# Patient Record
Sex: FEMALE | Race: WHITE | NOT HISPANIC OR LATINO | Employment: FULL TIME | URBAN - METROPOLITAN AREA
[De-identification: names, ages, dates, MRNs, and addresses within clinical notes are randomized per-mention and may not be internally consistent; named-entity substitution may affect disease eponyms.]

---

## 2017-06-14 ENCOUNTER — OFFICE VISIT (OUTPATIENT)
Dept: MEDICAL GROUP | Age: 62
End: 2017-06-14
Payer: COMMERCIAL

## 2017-06-14 VITALS
BODY MASS INDEX: 26.31 KG/M2 | HEIGHT: 62 IN | TEMPERATURE: 98.2 F | HEART RATE: 99 BPM | DIASTOLIC BLOOD PRESSURE: 70 MMHG | OXYGEN SATURATION: 96 % | WEIGHT: 143 LBS | SYSTOLIC BLOOD PRESSURE: 120 MMHG

## 2017-06-14 DIAGNOSIS — R73.01 IMPAIRED FASTING GLUCOSE: ICD-10-CM

## 2017-06-14 DIAGNOSIS — F43.21 ADJUSTMENT DISORDER WITH DEPRESSED MOOD: ICD-10-CM

## 2017-06-14 DIAGNOSIS — Z12.31 VISIT FOR SCREENING MAMMOGRAM: ICD-10-CM

## 2017-06-14 DIAGNOSIS — E55.9 VITAMIN D INSUFFICIENCY: ICD-10-CM

## 2017-06-14 DIAGNOSIS — E89.0 S/P PARTIAL THYROIDECTOMY: ICD-10-CM

## 2017-06-14 DIAGNOSIS — E78.00 HYPERCHOLESTEROLEMIA: ICD-10-CM

## 2017-06-14 DIAGNOSIS — Z86.39 HISTORY OF THYROID NODULE: ICD-10-CM

## 2017-06-14 DIAGNOSIS — Z12.11 SCREENING FOR COLON CANCER: ICD-10-CM

## 2017-06-14 PROCEDURE — 99204 OFFICE O/P NEW MOD 45 MIN: CPT | Performed by: INTERNAL MEDICINE

## 2017-06-14 ASSESSMENT — PATIENT HEALTH QUESTIONNAIRE - PHQ9
SUM OF ALL RESPONSES TO PHQ QUESTIONS 1-9: 15
CLINICAL INTERPRETATION OF PHQ2 SCORE: 6
5. POOR APPETITE OR OVEREATING: 0 - NOT AT ALL

## 2017-06-14 ASSESSMENT — PAIN SCALES - GENERAL: PAINLEVEL: NO PAIN

## 2017-06-15 NOTE — ASSESSMENT & PLAN NOTE
Patient has fasting blood sugar in the past. She is not very compliance with diet and exercise. She has family history of diabetes on her brother.

## 2017-06-15 NOTE — ASSESSMENT & PLAN NOTE
Patient reports that she has multiple thyroid nodules, but she does not have thyrotoxicosis or hyperthyroidism. She underwent partial thyroidectomy with removal of left thyroid and isthmus in 2009. She wants to check her thyroid hormone and thyroid ultrasound. She denies signs and symptoms of hypo-or hyperthyroidism currently. She has never taken thyroid hormone supplement. She reported that her mom has hypothyroid and needed to take thyroid hormone supplement.

## 2017-06-15 NOTE — ASSESSMENT & PLAN NOTE
Patient stated that she has low vitamin D and was told to take vitamin D 2000 units daily. She has not recheck her vitamin D for a few years. She wants to repeat vitamin D level.

## 2017-06-15 NOTE — ASSESSMENT & PLAN NOTE
She reported that she has multiple thyroid nodule that never has hyperthyroid state. She had a partial thyroidectomy in 2009. She stated that biopsy was negative for cancer.

## 2017-06-15 NOTE — ASSESSMENT & PLAN NOTE
Patient was told that her cholesterol level was high. She has never been treated with medication. She wants to check her cholesterol level.
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
## 2017-06-21 ENCOUNTER — HOSPITAL ENCOUNTER (OUTPATIENT)
Dept: RADIOLOGY | Facility: MEDICAL CENTER | Age: 62
End: 2017-06-21
Attending: INTERNAL MEDICINE
Payer: COMMERCIAL

## 2017-06-21 ENCOUNTER — TELEPHONE (OUTPATIENT)
Dept: MEDICAL GROUP | Age: 62
End: 2017-06-21

## 2017-06-21 DIAGNOSIS — E89.0 S/P PARTIAL THYROIDECTOMY: ICD-10-CM

## 2017-06-21 DIAGNOSIS — Z86.39 HISTORY OF THYROID NODULE: ICD-10-CM

## 2017-06-21 PROCEDURE — 76536 US EXAM OF HEAD AND NECK: CPT

## 2017-06-21 NOTE — TELEPHONE ENCOUNTER
Phone Number Called: [phone] (home)     Message: Patient informed of Dr. Garcia's message below and verbalized understanding.    Left Message for patient to call back: no

## 2017-06-21 NOTE — TELEPHONE ENCOUNTER
----- Message from Aminata Garcia M.D. sent at 6/21/2017  3:13 PM PDT -----  Please inform patient that her recent neck ultrasound showed that she has enlarged right thyroid gland, but no tumor found on thyroid gland. Left thyroid gland is surgically absent.    I will discuss the result again with patient on her upcoming follow-up appointment on 6/28/17.    Aminata Garcia M.D.

## 2017-06-24 LAB — HEMOCCULT STL QL IA: NEGATIVE

## 2017-06-27 ENCOUNTER — TELEPHONE (OUTPATIENT)
Dept: MEDICAL GROUP | Age: 62
End: 2017-06-27

## 2017-06-27 ENCOUNTER — HOSPITAL ENCOUNTER (OUTPATIENT)
Dept: RADIOLOGY | Facility: MEDICAL CENTER | Age: 62
End: 2017-06-27
Attending: INTERNAL MEDICINE
Payer: COMMERCIAL

## 2017-06-27 DIAGNOSIS — Z12.31 VISIT FOR SCREENING MAMMOGRAM: ICD-10-CM

## 2017-06-27 LAB
25(OH)D3+25(OH)D2 SERPL-MCNC: 42.8 NG/ML (ref 30–100)
ALBUMIN SERPL-MCNC: 4.8 G/DL (ref 3.6–4.8)
ALBUMIN/GLOB SERPL: 1.8 {RATIO} (ref 1.2–2.2)
ALP SERPL-CCNC: 64 IU/L (ref 39–117)
ALT SERPL-CCNC: 27 IU/L (ref 0–32)
AST SERPL-CCNC: 22 IU/L (ref 0–40)
BASOPHILS # BLD AUTO: 0.1 X10E3/UL (ref 0–0.2)
BASOPHILS NFR BLD AUTO: 1 %
BILIRUB SERPL-MCNC: 0.5 MG/DL (ref 0–1.2)
BUN SERPL-MCNC: 11 MG/DL (ref 8–27)
BUN/CREAT SERPL: 14 (ref 12–28)
CALCIUM SERPL-MCNC: 10.2 MG/DL (ref 8.7–10.3)
CHLORIDE SERPL-SCNC: 101 MMOL/L (ref 96–106)
CHOLEST SERPL-MCNC: 237 MG/DL (ref 100–199)
CO2 SERPL-SCNC: 22 MMOL/L (ref 18–29)
COMMENT 011824: ABNORMAL
CREAT SERPL-MCNC: 0.76 MG/DL (ref 0.57–1)
EOSINOPHIL # BLD AUTO: 0.1 X10E3/UL (ref 0–0.4)
EOSINOPHIL NFR BLD AUTO: 2 %
ERYTHROCYTE [DISTWIDTH] IN BLOOD BY AUTOMATED COUNT: 14.6 % (ref 12.3–15.4)
GLOBULIN SER CALC-MCNC: 2.6 G/DL (ref 1.5–4.5)
GLUCOSE SERPL-MCNC: 95 MG/DL (ref 65–99)
HBA1C MFR BLD: 5.7 % (ref 4.8–5.6)
HCT VFR BLD AUTO: 46.3 % (ref 34–46.6)
HDLC SERPL-MCNC: 70 MG/DL
HGB BLD-MCNC: 15.5 G/DL (ref 11.1–15.9)
IMM GRANULOCYTES # BLD: 0 X10E3/UL (ref 0–0.1)
IMM GRANULOCYTES NFR BLD: 0 %
IMMATURE CELLS  115398: NORMAL
LDLC SERPL CALC-MCNC: 131 MG/DL (ref 0–99)
LYMPHOCYTES # BLD AUTO: 2 X10E3/UL (ref 0.7–3.1)
LYMPHOCYTES NFR BLD AUTO: 31 %
MCH RBC QN AUTO: 30.4 PG (ref 26.6–33)
MCHC RBC AUTO-ENTMCNC: 33.5 G/DL (ref 31.5–35.7)
MCV RBC AUTO: 91 FL (ref 79–97)
MONOCYTES # BLD AUTO: 0.4 X10E3/UL (ref 0.1–0.9)
MONOCYTES NFR BLD AUTO: 6 %
MORPHOLOGY BLD-IMP: NORMAL
NEUTROPHILS # BLD AUTO: 4 X10E3/UL (ref 1.4–7)
NEUTROPHILS NFR BLD AUTO: 60 %
NRBC BLD AUTO-RTO: NORMAL %
PLATELET # BLD AUTO: 251 X10E3/UL (ref 150–379)
POTASSIUM SERPL-SCNC: 4 MMOL/L (ref 3.5–5.2)
PROT SERPL-MCNC: 7.4 G/DL (ref 6–8.5)
RBC # BLD AUTO: 5.1 X10E6/UL (ref 3.77–5.28)
SODIUM SERPL-SCNC: 143 MMOL/L (ref 134–144)
T4 FREE SERPL-MCNC: 1.64 NG/DL (ref 0.82–1.77)
TRIGL SERPL-MCNC: 182 MG/DL (ref 0–149)
TSH SERPL DL<=0.005 MIU/L-ACNC: 0.7 UIU/ML (ref 0.45–4.5)
VLDLC SERPL CALC-MCNC: 36 MG/DL (ref 5–40)
WBC # BLD AUTO: 6.6 X10E3/UL (ref 3.4–10.8)

## 2017-06-27 PROCEDURE — 77063 BREAST TOMOSYNTHESIS BI: CPT

## 2017-06-28 ENCOUNTER — OFFICE VISIT (OUTPATIENT)
Dept: MEDICAL GROUP | Age: 62
End: 2017-06-28
Payer: COMMERCIAL

## 2017-06-28 VITALS
WEIGHT: 141.6 LBS | HEART RATE: 81 BPM | BODY MASS INDEX: 26.06 KG/M2 | OXYGEN SATURATION: 97 % | DIASTOLIC BLOOD PRESSURE: 68 MMHG | TEMPERATURE: 97.4 F | SYSTOLIC BLOOD PRESSURE: 132 MMHG | HEIGHT: 62 IN

## 2017-06-28 DIAGNOSIS — Z23 NEED FOR VACCINATION: ICD-10-CM

## 2017-06-28 DIAGNOSIS — E78.00 HYPERCHOLESTEROLEMIA: ICD-10-CM

## 2017-06-28 DIAGNOSIS — R73.01 IMPAIRED FASTING GLUCOSE: ICD-10-CM

## 2017-06-28 DIAGNOSIS — E55.9 VITAMIN D INSUFFICIENCY: ICD-10-CM

## 2017-06-28 DIAGNOSIS — E89.0 S/P PARTIAL THYROIDECTOMY: ICD-10-CM

## 2017-06-28 PROCEDURE — 90736 HZV VACCINE LIVE SUBQ: CPT | Performed by: INTERNAL MEDICINE

## 2017-06-28 PROCEDURE — 90715 TDAP VACCINE 7 YRS/> IM: CPT | Performed by: INTERNAL MEDICINE

## 2017-06-28 PROCEDURE — 90472 IMMUNIZATION ADMIN EACH ADD: CPT | Performed by: INTERNAL MEDICINE

## 2017-06-28 PROCEDURE — 90471 IMMUNIZATION ADMIN: CPT | Performed by: INTERNAL MEDICINE

## 2017-06-28 PROCEDURE — 99214 OFFICE O/P EST MOD 30 MIN: CPT | Mod: 25 | Performed by: INTERNAL MEDICINE

## 2017-06-28 RX ORDER — PREDNISONE 2.5 MG/1
2.5 TABLET ORAL
Refills: 3 | COMMUNITY
Start: 2017-06-21

## 2017-06-28 RX ORDER — AMPICILLIN TRIHYDRATE 250 MG
CAPSULE ORAL 2 TIMES DAILY
COMMUNITY

## 2017-06-28 ASSESSMENT — PATIENT HEALTH QUESTIONNAIRE - PHQ9: CLINICAL INTERPRETATION OF PHQ2 SCORE: 0

## 2017-06-28 NOTE — ASSESSMENT & PLAN NOTE
Patient likes to eat carbohydrates such as rice and noodle. She has chronically elevated fasting blood sugar and intermittent elevated A1c in the past. Her fasting blood sugar was 95 on 6/21/17. A1c was 5.7 on 6/21/17. She stated that her brother has diabetes. We reviewed recent blood test and discussed about prediabetes and diabetes and how to manage her diet to decrease blood sugar.

## 2017-06-28 NOTE — MR AVS SNAPSHOT
"        Janice Higuera   2017 8:40 AM   Office Visit   MRN: [id number]    Department:  [address] [place] [address]   Dept Phone:  [phone]    Description:  Female : 1955   Provider:  Aminata Garcia M.D.           Reason for Visit     Vitamin D Deficiency Lab review      Allergies as of 2017     Allergen Noted Reactions    Penicillins 2017       Pt family has history of allergy to penicillin. They get hives and anaphylactic shock      You were diagnosed with     Vitamin D insufficiency   [104015]       Impaired fasting glucose   [790.21.ICD-9-CM]       Hypercholesterolemia   [332773]       Need for vaccination   [952445]         Vital Signs     Blood Pressure Pulse Temperature Height Weight Body Mass Index    132/68 mmHg 81 36.3 °C (97.4 °F) 1.575 m (5' 2\") 64.229 kg (141 lb 9.6 oz) 25.89 kg/m2    Oxygen Saturation Smoking Status                97% Never Smoker           Basic Information     Date Of Birth Sex Race Ethnicity Preferred Language    1955 Female White Non- English      Your appointments     Dec 05, 2017 11:00 AM   Established Patient with Aminata Garcia M.D.   [address] [address] [phone]           You will be receiving a confirmation call a few days before your appointment from our automated call confirmation system.            Dec 18, 2017 10:20 AM   Established Patient with Aminata Garcia M.D.   [address] [address] [phone]           You will be receiving a confirmation call a few days before your appointment from our automated call confirmation system.              Problem List              ICD-10-CM Priority Class Noted - Resolved    Health examination of defined subpopulation Z02.89   10/19/2012 - Present    Impaired fasting glucose R73.01   2013 - Present    Vitamin D insufficiency E55.9   2013 - " Present    BMI 26.0-26.9,adult Z68.26   1/23/2013 - Present    Hypercholesterolemia E78.00   1/23/2013 - Present    Adjustment disorder with depressed mood F43.21   6/14/2017 - Present    History of thyroid nodule Z86.39   6/14/2017 - Present    S/P partial thyroidectomy E89.0   6/14/2017 - Present      Health Maintenance        Date Due Completion Dates    IMM DTaP/Tdap/Td Vaccine (1 - Tdap) 6/30/1974 ---    PAP SMEAR 6/30/1976 ---    MAMMOGRAM 6/30/1995 ---    IMM ZOSTER VACCINE 6/30/2015 ---    COLON CANCER SCREENING ANNUAL FIT 6/22/2018 6/22/2017            Current Immunizations     INFLUENZA VACCINE H1N1 10/13/2009    Influenza TIV (IM) 9/23/2012    Influenza Vaccine Quad Inj (Pf) 10/20/2014, 10/5/2010    Influenza Vaccine Quad Inj (Preserved) 10/14/2016, 10/2/2015, 10/10/2013    SHINGLES VACCINE  Incomplete    Tdap Vaccine  Incomplete    Tuberculin Skin Test 10/19/2012 11:05 AM      Below and/or attached are the medications your provider expects you to take. Review all of your home medications and newly ordered medications with your provider and/or pharmacist. Follow medication instructions as directed by your provider and/or pharmacist. Please keep your medication list with you and share with your provider. Update the information when medications are discontinued, doses are changed, or new medications (including over-the-counter products) are added; and carry medication information at all times in the event of emergency situations     Allergies:  PENICILLINS - (reactions not documented)               Medications  Valid as of: June 28, 2017 -  9:16 AM    Generic Name Brand Name Tablet Size Instructions for use    Cholecalciferol (Cap) Vitamin D 2000 UNITS Take  by mouth.          Multiple Vitamins-Minerals (Tab) THERAGRAN-M  Take 1 Tab by mouth every day.          Omega-3 Fatty Acids (Cap) Omega-3 Fatty Acids 1200 MG Take  by mouth.          PredniSONE (Tab) DELTASONE 2.5 MG Take 2.5 mg by mouth 1 time daily  as needed.        .                 Medicines prescribed today were sent to:     [id number] - [place], [place] - [address]    [address] [place] [address]    Phone: [phone] Fax: [phone]    Open 24 Hours?: No      Medication refill instructions:       If your prescription bottle indicates you have medication refills left, it is not necessary to call your provider’s office. Please contact your pharmacy and they will refill your medication.    If your prescription bottle indicates you do not have any refills left, you may request refills at any time through one of the following ways: The online [employer] system (except Urgent Care), by calling your provider’s office, or by asking your pharmacy to contact your provider’s office with a refill request. Medication refills are processed only during regular business hours and may not be available until the next business day. Your provider may request additional information or to have a follow-up visit with you prior to refilling your medication.   *Please Note: Medication refills are assigned a new Rx number when refilled electronically. Your pharmacy may indicate that no refills were authorized even though a new prescription for the same medication is available at the pharmacy. Please request the medicine by name with the pharmacy before contacting your provider for a refill.        Your To Do List     Future Labs/Procedures Complete By Expires    COMP METABOLIC PANEL  As directed 6/29/2018    HEMOGLOBIN A1C  As directed 6/29/2018    LIPID PROFILE  As directed 6/29/2018         [employer] Access Code: Activation code not generated  Current [employer] Status: Active

## 2017-06-28 NOTE — ASSESSMENT & PLAN NOTE
Patient has history of partial thyroidectomy for thyroid nodules. Recent thyroid function test was within normal. She is in euthyroid state. She is not taking any thyroid hormone supplement currently.    Results for SARAH ZAMBRANO CYNTHIA ELIZABETH (MRN [id number]) as of 6/28/2017 09:23   Ref. Range 6/21/2017 14:41   TSH Latest Ref Range: 0.450-4.500 uIU/mL 0.705   Free T-4 Latest Ref Range: 0.82-1.77 ng/dL 1.64

## 2017-06-28 NOTE — ASSESSMENT & PLAN NOTE
Patient has elevated total cholesterol, triglyceride and LDL cholesterol. She is taking omega-3 1200 mg daily. She has family history of heart disease, hypertension, coronary artery disease and diabetes. Her ASCVD score was 3.8. We discussed to try nonpharmacological treatment with aggressive lifestyle changes with healthy diet and exercise. We also discussed to try red yeast rice in addition to omega-3. Patient agrees with the plan.

## 2017-06-28 NOTE — PROGRESS NOTES
Subjective:   Cynthia Higuera is a 61 y.o. female here today for evaluation and management of:      Vitamin D insufficiency  Patient is taking vitamin D 2000 units daily. Her repeat a vitamin D level showed adequate at 42.8 on 6/21/17.    Impaired fasting glucose  Patient likes to eat carbohydrates such as rice and noodle. She has chronically elevated fasting blood sugar and intermittent elevated A1c in the past. Her fasting blood sugar was 95 on 6/21/17. A1c was 5.7 on 6/21/17. She stated that her brother has diabetes. We reviewed recent blood test and discussed about prediabetes and diabetes and how to manage her diet to decrease blood sugar.    Hypercholesterolemia  Patient has elevated total cholesterol, triglyceride and LDL cholesterol. She is taking omega-3 1200 mg daily. She has family history of heart disease, hypertension, coronary artery disease and diabetes. Her ASCVD score was 3.8. We discussed to try nonpharmacological treatment with aggressive lifestyle changes with healthy diet and exercise. We also discussed to try red yeast rice in addition to omega-3. Patient agrees with the plan.    S/P partial thyroidectomy  Patient has history of partial thyroidectomy for thyroid nodules. Recent thyroid function test was within normal. She is in euthyroid state. She is not taking any thyroid hormone supplement currently.    Results for CYNTHIA PARKER (MRN [id number]) as of 6/28/2017 09:23   Ref. Range 6/21/2017 14:41   TSH Latest Ref Range: 0.450-4.500 uIU/mL 0.705   Free T-4 Latest Ref Range: 0.82-1.77 ng/dL 1.64          Current medicines (including changes today)  Current Outpatient Prescriptions   Medication Sig Dispense Refill   • prednisONE (DELTASONE) 2.5 MG Tab Take 2.5 mg by mouth 1 time daily as needed.  3   • Red Yeast Rice 600 MG Cap Take  by mouth 2 Times a Day.     • therapeutic multivitamin-minerals (THERAGRAN-M) TABS Take 1 Tab by mouth every day.       • Cholecalciferol (VITAMIN D)  "2000 UNITS CAPS Take  by mouth.       • Omega-3 Fatty Acids (FISH OIL) 1200 MG CAPS Take  by mouth.         No current facility-administered medications for this visit.     She  has a past medical history of Hyperlipidemia; IFG (impaired fasting glucose); and Vitamin D insufficiency.    ROS   No chest pain, no shortness of breath, no abdominal pain       Objective:     Blood pressure 132/68, pulse 81, temperature 36.3 °C (97.4 °F), height 1.575 m (5' 2\"), weight 64.229 kg (141 lb 9.6 oz), SpO2 97 %. Body mass index is 25.89 kg/(m^2).   Physical Exam:  General: Alert, oriented and no acute distress.  Eye contact is good, speech goal directed, affect calm  HEENT: conjunctiva non-injected, sclera non-icteric.  Oral mucous membranes pink and moist with no lesions.  Pinna normal.   Lungs: Normal respiratory effort, clear to auscultation bilaterally with good excursion.  CV: regular rate and rhythm. No murmurs.  Abdomen: soft, non distended, nontender, Bowel sound normal.  Ext: no edema, color normal, vascularity normal, temperature normal        Assessment and Plan:   The following treatment plan was discussed     1. Vitamin D insufficiency  - Well-controlled. Continue vitamin D3 2000 units daily.    2. Impaired fasting glucose  - A1c 5.7. She is in the prediabetic range. We discussed to cut down simple carbohydrates and sugar on her diet. We discussed to do regular cardio exercise.  - COMP METABOLIC PANEL; Future  - HEMOGLOBIN A1C; Future    3. Hypercholesterolemia  - Not on medication. Discussed lifestyle modification with healthy diet and exercise.  - Advised to try red yeast rice 600 mg twice a day and omega-3 1200 mg daily.  - Advised to eat low fat, low carbohydrate and high fiber diet as well as do cardio physical exercise regularly.   - LIPID PROFILE; Future    4. S/P partial thyroidectomy  - Asymptomatic. Recent thyroid function tests within normal.    5. Need for vaccination  - Tdap and Zostavax vaccine was " given today after reviewing risks and benefits as well as side effects of vaccine.  - TDAP VACCINE =>6YO IM  - VARICELLA ZOSTER VACCINE SQ    6. Health Maintenance  - Patient has mammogram yesterday. The report of mammogram is still pending. We will advise for mammogram report after result is uploading. Patient is advised to do Pap smear. Her last Pap smear was in 2009. She stated that she has normal Pap smear or daytime. Patient will follow with gynecologist to have heavy exam and Pap this year. She does not want to do colonoscopy, so we discussed to do stool fit test. She has negative stool fit test on 6/22/17.     Face-to-face time spent 30 minutes with patient and more than half of that time spent for counseling and cooperating of care for medical problems listed above.       Followup: Return in about 6 months (around 12/28/2017), or if symptoms worsen or fail to improve, for vitamin D insufficiency, hypercholesterolemia, prediabetes, lab review.      Please note that this dictation was created using voice recognition software. I have made every reasonable attempt to correct obvious errors, but I expect that there may have unintended errors in text, spelling, punctuation, or grammar that I did not discover.

## 2017-06-28 NOTE — ASSESSMENT & PLAN NOTE
Patient is taking vitamin D 2000 units daily. Her repeat a vitamin D level showed adequate at 42.8 on 6/21/17.

## 2017-07-03 DIAGNOSIS — N63.10 LUMP OF RIGHT BREAST: ICD-10-CM

## 2017-07-03 NOTE — PROGRESS NOTES
Informed patient regarding mammogram, pt states she had a ultrasound done a few years ago & was negative?

## 2017-07-13 ENCOUNTER — NON-PROVIDER VISIT (OUTPATIENT)
Dept: URGENT CARE | Facility: PHYSICIAN GROUP | Age: 62
End: 2017-07-13

## 2017-07-13 DIAGNOSIS — Z02.1 DRUG TESTING, PRE-EMPLOYMENT: ICD-10-CM

## 2017-07-13 PROCEDURE — 8898 PR URINE 11 PANEL - SEND TO LAB: Performed by: PHYSICIAN ASSISTANT

## 2017-07-19 ENCOUNTER — HOSPITAL ENCOUNTER (OUTPATIENT)
Facility: MEDICAL CENTER | Age: 62
End: 2017-07-19
Attending: PREVENTIVE MEDICINE
Payer: COMMERCIAL

## 2017-07-19 ENCOUNTER — OFFICE VISIT (OUTPATIENT)
Dept: OCCUPATIONAL MEDICINE | Facility: CLINIC | Age: 62
End: 2017-07-19

## 2017-07-19 ENCOUNTER — NON-PROVIDER VISIT (OUTPATIENT)
Dept: OCCUPATIONAL MEDICINE | Facility: CLINIC | Age: 62
End: 2017-07-19
Payer: COMMERCIAL

## 2017-07-19 VITALS
DIASTOLIC BLOOD PRESSURE: 80 MMHG | TEMPERATURE: 99.2 F | HEART RATE: 74 BPM | RESPIRATION RATE: 12 BRPM | HEIGHT: 62 IN | SYSTOLIC BLOOD PRESSURE: 138 MMHG | OXYGEN SATURATION: 94 % | BODY MASS INDEX: 25.95 KG/M2 | WEIGHT: 141 LBS

## 2017-07-19 DIAGNOSIS — Z02.89 ENCOUNTER FOR OCCUPATIONAL HEALTH ASSESSMENT: ICD-10-CM

## 2017-07-19 PROCEDURE — 86580 TB INTRADERMAL TEST: CPT | Performed by: PREVENTIVE MEDICINE

## 2017-07-19 PROCEDURE — 86735 MUMPS ANTIBODY: CPT | Performed by: PREVENTIVE MEDICINE

## 2017-07-19 PROCEDURE — 86765 RUBEOLA ANTIBODY: CPT | Performed by: PREVENTIVE MEDICINE

## 2017-07-19 PROCEDURE — 86762 RUBELLA ANTIBODY: CPT | Performed by: PREVENTIVE MEDICINE

## 2017-07-19 PROCEDURE — 87340 HEPATITIS B SURFACE AG IA: CPT | Performed by: PREVENTIVE MEDICINE

## 2017-07-19 PROCEDURE — 86706 HEP B SURFACE ANTIBODY: CPT | Performed by: PREVENTIVE MEDICINE

## 2017-07-19 PROCEDURE — 86704 HEP B CORE ANTIBODY TOTAL: CPT | Performed by: PREVENTIVE MEDICINE

## 2017-07-19 PROCEDURE — 8915 PR COMPREHENSIVE PHYSICAL: Performed by: PREVENTIVE MEDICINE

## 2017-07-19 NOTE — MR AVS SNAPSHOT
Janice JOHNSON Vivien Higuera   2017 2:40 PM   Office Visit   MRN: [id number]    Department:  [facility]   Dept Phone:  [phone]    Description:  Female : 1955   Provider:  John Smith M.D.           Reason for Visit     Other Physical Room 1      Allergies as of 2017     Allergen Noted Reactions    Penicillins 2017       Pt family has history of allergy to penicillin. They get hives and anaphylactic shock      You were diagnosed with     Encounter for occupational health assessment   [[id number]]         Vital Signs     Smoking Status                   Never Smoker            Basic Information     Date Of Birth Sex Race Ethnicity Preferred Language    1955 Female White Non- English      Your appointments     Dec 05, 2017 11:00 AM   Established Patient with Aminata Garcia M.D.   [place] [address] [address]  [facility] [id number] [phone]           You will be receiving a confirmation call a few days before your appointment from our automated call confirmation system.              Problem List              ICD-10-CM Priority Class Noted - Resolved    Health examination of defined subpopulation Z02.89   10/19/2012 - Present    Impaired fasting glucose R73.01   2013 - Present    Vitamin D insufficiency E55.9   2013 - Present    BMI 26.0-26.9,adult Z68.26   2013 - Present    Hypercholesterolemia E78.00   2013 - Present    Adjustment disorder with depressed mood F43.21   2017 - Present    History of thyroid nodule Z86.39   2017 - Present    S/P partial thyroidectomy E89.0   2017 - Present      Health Maintenance        Date Due Completion Dates    PAP SMEAR 1976 ---    COLONOSCOPY 2005 ---    IMM INFLUENZA (1) 2017 10/14/2016, 10/2/2015, 10/20/2014, 10/10/2013, 2012, 10/5/2010    COLON CANCER SCREENING ANNUAL FIT 2018    MAMMOGRAM 2018    IMM  DTaP/Tdap/Td Vaccine (2 - Td) 6/28/2027 6/28/2017            Current Immunizations     INFLUENZA VACCINE H1N1 10/13/2009    Influenza TIV (IM) 9/23/2012    Influenza Vaccine Quad Inj (Pf) 10/20/2014, 10/5/2010    Influenza Vaccine Quad Inj (Preserved) 10/14/2016, 10/2/2015, 10/10/2013    SHINGLES VACCINE 6/28/2017    Tdap Vaccine 6/28/2017    Tuberculin Skin Test 7/19/2017  2:35 PM, 10/19/2012 11:05 AM      Below and/or attached are the medications your provider expects you to take. Review all of your home medications and newly ordered medications with your provider and/or pharmacist. Follow medication instructions as directed by your provider and/or pharmacist. Please keep your medication list with you and share with your provider. Update the information when medications are discontinued, doses are changed, or new medications (including over-the-counter products) are added; and carry medication information at all times in the event of emergency situations     Allergies:  PENICILLINS - (reactions not documented)               Medications  Valid as of: July 19, 2017 -  3:23 PM    Generic Name Brand Name Tablet Size Instructions for use    Cholecalciferol (Cap) Vitamin D 2000 UNITS Take  by mouth.          Multiple Vitamins-Minerals (Tab) THERAGRAN-M  Take 1 Tab by mouth every day.          Omega-3 Fatty Acids (Cap) Omega-3 Fatty Acids 1200 MG Take  by mouth.          PredniSONE (Tab) DELTASONE 2.5 MG Take 2.5 mg by mouth 1 time daily as needed.        Red Yeast Rice Extract (Cap) Red Yeast Rice 600 MG Take  by mouth 2 Times a Day.        .                 Medicines prescribed today were sent to:     [id number] - [place], [place] - [address]    [address] [place] [address]    Phone: [phone] Fax: [phone]    Open 24 Hours?: No      Medication refill instructions:       If your prescription bottle indicates you have medication refills left, it is not necessary to call your provider’s office.  Please contact your pharmacy and they will refill your medication.    If your prescription bottle indicates you do not have any refills left, you may request refills at any time through one of the following ways: The online [employer] system (except Urgent Care), by calling your provider’s office, or by asking your pharmacy to contact your provider’s office with a refill request. Medication refills are processed only during regular business hours and may not be available until the next business day. Your provider may request additional information or to have a follow-up visit with you prior to refilling your medication.   *Please Note: Medication refills are assigned a new Rx number when refilled electronically. Your pharmacy may indicate that no refills were authorized even though a new prescription for the same medication is available at the pharmacy. Please request the medicine by name with the pharmacy before contacting your provider for a refill.           [employer] Access Code: Activation code not generated  Current [employer] Status: Active

## 2017-07-20 LAB
HBV CORE AB SERPL QL IA: REACTIVE
HBV SURFACE AB SERPL IA-ACNC: 96.26 MIU/ML (ref 0–10)
HBV SURFACE AG SER QL: NEGATIVE
MEV IGG SER IA-ACNC: POSITIVE
MUV IGG SER IA-ACNC: POSITIVE
RUBV AB SER QL: >500 IU/ML

## 2017-07-22 ENCOUNTER — NON-PROVIDER VISIT (OUTPATIENT)
Dept: URGENT CARE | Facility: CLINIC | Age: 62
End: 2017-07-22

## 2017-07-22 LAB — TB WHEAL 3D P 5 TU DIAM: NORMAL MM

## 2017-07-26 ENCOUNTER — NON-PROVIDER VISIT (OUTPATIENT)
Dept: OCCUPATIONAL MEDICINE | Facility: CLINIC | Age: 62
End: 2017-07-26

## 2017-07-26 DIAGNOSIS — Z11.1 ENCOUNTER FOR PPD TEST: ICD-10-CM

## 2017-07-26 PROCEDURE — 86580 TB INTRADERMAL TEST: CPT | Performed by: PREVENTIVE MEDICINE

## 2017-07-28 ENCOUNTER — NON-PROVIDER VISIT (OUTPATIENT)
Dept: OCCUPATIONAL MEDICINE | Facility: CLINIC | Age: 62
End: 2017-07-28

## 2017-07-28 LAB — TB WHEAL 3D P 5 TU DIAM: NORMAL MM

## 2017-08-03 ENCOUNTER — HOSPITAL ENCOUNTER (OUTPATIENT)
Dept: RADIOLOGY | Facility: MEDICAL CENTER | Age: 62
End: 2017-08-03

## 2017-12-04 NOTE — ASSESSMENT & PLAN NOTE
Patient is not taking statin currently. She is taking over-the-counter red yeast rice and omega-3. She tries to control her cholesterol with diet and exercise. Last lipid panel showed elevated total cholesterol, triglycerides and LDL cholesterol.    Results for CYNTHIA PARKER (MRN [id number]) as of 12/4/2017 12:55   Ref. Range 6/21/2017 14:41   Cholesterol,Tot Latest Ref Range: 100 - 199 mg/dL 237 (H)   Triglycerides Latest Ref Range: 0 - 149 mg/dL 182 (H)   HDL Latest Ref Range: >39 mg/dL 70   LDL Latest Ref Range: 0 - 99 mg/dL 131 (H)   VLDL Cholesterol Calc Latest Ref Range: 5 - 40 mg/dL 36

## 2017-12-04 NOTE — ASSESSMENT & PLAN NOTE
Patient is taking vitamin D 2000 units daily. Her vitamin D level is adequate with supplements. Last vitamin D level was 42.8 on 6/21/17.

## 2017-12-04 NOTE — ASSESSMENT & PLAN NOTE
Patient likes to eat starchy diet such as rice inhibitors. She has elevated A1c at 5.7 on 6/21/17. She has family history of diabetes on her brother. She denied polyuria or polydipsia.

## 2017-12-05 ENCOUNTER — APPOINTMENT (OUTPATIENT)
Dept: MEDICAL GROUP | Age: 62
End: 2017-12-05
Payer: COMMERCIAL

## 2021-03-03 DIAGNOSIS — Z23 NEED FOR VACCINATION: ICD-10-CM
